# Patient Record
Sex: MALE | Race: WHITE | NOT HISPANIC OR LATINO | Employment: FULL TIME | URBAN - METROPOLITAN AREA
[De-identification: names, ages, dates, MRNs, and addresses within clinical notes are randomized per-mention and may not be internally consistent; named-entity substitution may affect disease eponyms.]

---

## 2023-06-09 DIAGNOSIS — B37.0 THRUSH, ORAL: Primary | ICD-10-CM

## 2023-06-09 RX ORDER — TRIAMCINOLONE ACETONIDE 1 MG/G
1 PASTE DENTAL DAILY
Qty: 30 G | Refills: 1 | Status: SHIPPED | OUTPATIENT
Start: 2023-06-09 | End: 2023-11-09 | Stop reason: SDUPTHER

## 2023-06-09 RX ORDER — TRIAMCINOLONE ACETONIDE 1 MG/G
1 PASTE DENTAL DAILY
COMMUNITY
Start: 2020-03-11 | End: 2023-06-09 | Stop reason: SDUPTHER

## 2023-06-20 DIAGNOSIS — G62.9 NEUROPATHY: ICD-10-CM

## 2023-06-20 DIAGNOSIS — M19.90 CHRONIC ARTHRITIS: ICD-10-CM

## 2023-08-29 RX ORDER — SILDENAFIL 100 MG/1
100 TABLET, FILM COATED ORAL AS NEEDED
COMMUNITY
Start: 2021-05-05 | End: 2023-09-07 | Stop reason: SDUPTHER

## 2023-09-05 RX ORDER — LEVOTHYROXINE SODIUM 75 UG/1
75 TABLET ORAL DAILY
COMMUNITY

## 2023-09-05 RX ORDER — TAMSULOSIN HYDROCHLORIDE 0.4 MG/1
0.4 CAPSULE ORAL NIGHTLY
COMMUNITY
Start: 2023-06-29

## 2023-09-05 RX ORDER — AMMONIUM LACTATE 12 G/100G
CREAM TOPICAL
COMMUNITY
Start: 2023-05-19

## 2023-09-05 RX ORDER — HYDROXYZINE HYDROCHLORIDE 10 MG/1
10 TABLET, FILM COATED ORAL NIGHTLY
COMMUNITY
Start: 2023-07-28

## 2023-09-05 RX ORDER — FLUOCINONIDE 0.5 MG/G
CREAM TOPICAL
COMMUNITY
Start: 2023-03-07

## 2023-09-05 RX ORDER — FINASTERIDE 5 MG/1
5 TABLET, FILM COATED ORAL DAILY
COMMUNITY

## 2023-09-05 RX ORDER — CLOBETASOL PROPIONATE 0.5 MG/G
CREAM TOPICAL
COMMUNITY
Start: 2023-05-19

## 2023-09-06 ENCOUNTER — CLINICAL SUPPORT (OUTPATIENT)
Dept: PRIMARY CARE | Facility: CLINIC | Age: 68
End: 2023-09-06
Payer: MEDICARE

## 2023-09-06 ENCOUNTER — APPOINTMENT (OUTPATIENT)
Dept: PRIMARY CARE | Facility: CLINIC | Age: 68
End: 2023-09-06
Payer: MEDICARE

## 2023-09-06 DIAGNOSIS — R79.9 ABNORMAL BLOOD CHEMISTRY: ICD-10-CM

## 2023-09-06 DIAGNOSIS — E55.9 VITAMIN D DEFICIENCY: ICD-10-CM

## 2023-09-06 DIAGNOSIS — Z00.00 LABORATORY TESTS ORDERED AS PART OF A COMPLETE PHYSICAL EXAM (CPE): ICD-10-CM

## 2023-09-06 PROCEDURE — 90662 IIV NO PRSV INCREASED AG IM: CPT | Performed by: INTERNAL MEDICINE

## 2023-09-06 PROCEDURE — G0008 ADMIN INFLUENZA VIRUS VAC: HCPCS | Performed by: INTERNAL MEDICINE

## 2023-09-06 NOTE — PROGRESS NOTES
Patient is here for high dose flu vaccine in left deltoid 0.7 mL used I/o vaccine used, tolerated well.

## 2023-09-07 DIAGNOSIS — N52.9 ERECTILE DYSFUNCTION, UNSPECIFIED ERECTILE DYSFUNCTION TYPE: Primary | ICD-10-CM

## 2023-09-08 RX ORDER — SILDENAFIL 100 MG/1
100 TABLET, FILM COATED ORAL AS NEEDED
Qty: 6 TABLET | Refills: 5 | Status: SHIPPED | OUTPATIENT
Start: 2023-09-08 | End: 2023-12-15

## 2023-10-17 ENCOUNTER — LAB (OUTPATIENT)
Dept: LAB | Facility: LAB | Age: 68
End: 2023-10-17
Payer: MEDICARE

## 2023-10-17 DIAGNOSIS — Z00.00 LABORATORY TESTS ORDERED AS PART OF A COMPLETE PHYSICAL EXAM (CPE): ICD-10-CM

## 2023-10-17 DIAGNOSIS — R79.9 ABNORMAL BLOOD CHEMISTRY: ICD-10-CM

## 2023-10-17 DIAGNOSIS — E55.9 VITAMIN D DEFICIENCY: ICD-10-CM

## 2023-10-17 LAB
25(OH)D3 SERPL-MCNC: 25 NG/ML (ref 30–100)
ALT SERPL W P-5'-P-CCNC: 25 U/L (ref 10–52)
ANION GAP SERPL CALC-SCNC: 12 MMOL/L (ref 10–20)
APPEARANCE UR: CLEAR
BASOPHILS # BLD AUTO: 0.07 X10*3/UL (ref 0–0.1)
BASOPHILS NFR BLD AUTO: 1.3 %
BILIRUB UR STRIP.AUTO-MCNC: NEGATIVE MG/DL
BUN SERPL-MCNC: 8 MG/DL (ref 6–23)
CALCIUM SERPL-MCNC: 9.2 MG/DL (ref 8.6–10.6)
CHLORIDE SERPL-SCNC: 100 MMOL/L (ref 98–107)
CHOLEST SERPL-MCNC: 167 MG/DL (ref 0–199)
CHOLESTEROL/HDL RATIO: 2.3
CO2 SERPL-SCNC: 25 MMOL/L (ref 21–32)
COLOR UR: NORMAL
CREAT SERPL-MCNC: 0.76 MG/DL (ref 0.5–1.3)
EOSINOPHIL # BLD AUTO: 0.34 X10*3/UL (ref 0–0.7)
EOSINOPHIL NFR BLD AUTO: 6.5 %
ERYTHROCYTE [DISTWIDTH] IN BLOOD BY AUTOMATED COUNT: 13 % (ref 11.5–14.5)
EST. AVERAGE GLUCOSE BLD GHB EST-MCNC: 111 MG/DL
GFR SERPL CREATININE-BSD FRML MDRD: >90 ML/MIN/1.73M*2
GLUCOSE SERPL-MCNC: 92 MG/DL (ref 74–99)
GLUCOSE UR STRIP.AUTO-MCNC: NEGATIVE MG/DL
HBA1C MFR BLD: 5.5 %
HCT VFR BLD AUTO: 43.4 % (ref 41–52)
HDLC SERPL-MCNC: 71.2 MG/DL
HGB BLD-MCNC: 14.2 G/DL (ref 13.5–17.5)
IMM GRANULOCYTES # BLD AUTO: 0.01 X10*3/UL (ref 0–0.7)
IMM GRANULOCYTES NFR BLD AUTO: 0.2 % (ref 0–0.9)
KETONES UR STRIP.AUTO-MCNC: NEGATIVE MG/DL
LDLC SERPL CALC-MCNC: 85 MG/DL
LEUKOCYTE ESTERASE UR QL STRIP.AUTO: NEGATIVE
LYMPHOCYTES # BLD AUTO: 1.46 X10*3/UL (ref 1.2–4.8)
LYMPHOCYTES NFR BLD AUTO: 27.8 %
MCH RBC QN AUTO: 31.5 PG (ref 26–34)
MCHC RBC AUTO-ENTMCNC: 32.7 G/DL (ref 32–36)
MCV RBC AUTO: 96 FL (ref 80–100)
MONOCYTES # BLD AUTO: 0.52 X10*3/UL (ref 0.1–1)
MONOCYTES NFR BLD AUTO: 9.9 %
NEUTROPHILS # BLD AUTO: 2.85 X10*3/UL (ref 1.2–7.7)
NEUTROPHILS NFR BLD AUTO: 54.3 %
NITRITE UR QL STRIP.AUTO: NEGATIVE
NON HDL CHOLESTEROL: 96 MG/DL (ref 0–149)
NRBC BLD-RTO: 0 /100 WBCS (ref 0–0)
PH UR STRIP.AUTO: 8 [PH]
PLATELET # BLD AUTO: 200 X10*3/UL (ref 150–450)
PMV BLD AUTO: 11.3 FL (ref 7.5–11.5)
POTASSIUM SERPL-SCNC: 4.4 MMOL/L (ref 3.5–5.3)
PROT UR STRIP.AUTO-MCNC: NEGATIVE MG/DL
PSA SERPL-MCNC: 0.28 NG/ML
RBC # BLD AUTO: 4.51 X10*6/UL (ref 4.5–5.9)
RBC # UR STRIP.AUTO: NEGATIVE /UL
SODIUM SERPL-SCNC: 133 MMOL/L (ref 136–145)
SP GR UR STRIP.AUTO: 1
TRIGL SERPL-MCNC: 55 MG/DL (ref 0–149)
TSH SERPL-ACNC: 5.6 MIU/L (ref 0.44–3.98)
UROBILINOGEN UR STRIP.AUTO-MCNC: <2 MG/DL
VIT B12 SERPL-MCNC: 498 PG/ML (ref 211–911)
VLDL: 11 MG/DL (ref 0–40)
WBC # BLD AUTO: 5.3 X10*3/UL (ref 4.4–11.3)

## 2023-10-17 PROCEDURE — 84460 ALANINE AMINO (ALT) (SGPT): CPT

## 2023-10-17 PROCEDURE — 80061 LIPID PANEL: CPT

## 2023-10-17 PROCEDURE — 84153 ASSAY OF PSA TOTAL: CPT

## 2023-10-17 PROCEDURE — 81003 URINALYSIS AUTO W/O SCOPE: CPT

## 2023-10-17 PROCEDURE — 80048 BASIC METABOLIC PNL TOTAL CA: CPT

## 2023-10-17 PROCEDURE — 82607 VITAMIN B-12: CPT

## 2023-10-17 PROCEDURE — 84443 ASSAY THYROID STIM HORMONE: CPT

## 2023-10-17 PROCEDURE — 83036 HEMOGLOBIN GLYCOSYLATED A1C: CPT

## 2023-10-17 PROCEDURE — 36415 COLL VENOUS BLD VENIPUNCTURE: CPT

## 2023-10-17 PROCEDURE — 85025 COMPLETE CBC W/AUTO DIFF WBC: CPT

## 2023-10-17 PROCEDURE — 82306 VITAMIN D 25 HYDROXY: CPT

## 2023-11-07 ENCOUNTER — APPOINTMENT (OUTPATIENT)
Dept: PRIMARY CARE | Facility: CLINIC | Age: 68
End: 2023-11-07
Payer: MEDICARE

## 2023-11-08 RX ORDER — FLUTICASONE PROPIONATE 50 MCG
1 SPRAY, SUSPENSION (ML) NASAL EVERY 12 HOURS PRN
COMMUNITY
Start: 2014-11-10

## 2023-11-08 RX ORDER — ACETAMINOPHEN 500 MG
500-1000 TABLET ORAL EVERY 8 HOURS PRN
COMMUNITY
Start: 2021-05-26

## 2023-11-09 ENCOUNTER — OFFICE VISIT (OUTPATIENT)
Dept: PRIMARY CARE | Facility: CLINIC | Age: 68
End: 2023-11-09
Payer: MEDICARE

## 2023-11-09 VITALS
SYSTOLIC BLOOD PRESSURE: 118 MMHG | BODY MASS INDEX: 24 KG/M2 | OXYGEN SATURATION: 99 % | HEART RATE: 72 BPM | HEIGHT: 74 IN | DIASTOLIC BLOOD PRESSURE: 70 MMHG | WEIGHT: 187 LBS | RESPIRATION RATE: 17 BRPM

## 2023-11-09 DIAGNOSIS — R39.9 URINARY SYMPTOM OR SIGN: Primary | ICD-10-CM

## 2023-11-09 DIAGNOSIS — Z00.00 WELL ADULT EXAM: ICD-10-CM

## 2023-11-09 DIAGNOSIS — B37.0 THRUSH, ORAL: ICD-10-CM

## 2023-11-09 PROCEDURE — 1126F AMNT PAIN NOTED NONE PRSNT: CPT | Performed by: INTERNAL MEDICINE

## 2023-11-09 PROCEDURE — 1170F FXNL STATUS ASSESSED: CPT | Performed by: INTERNAL MEDICINE

## 2023-11-09 PROCEDURE — G0439 PPPS, SUBSEQ VISIT: HCPCS | Performed by: INTERNAL MEDICINE

## 2023-11-09 PROCEDURE — 1036F TOBACCO NON-USER: CPT | Performed by: INTERNAL MEDICINE

## 2023-11-09 PROCEDURE — 1159F MED LIST DOCD IN RCRD: CPT | Performed by: INTERNAL MEDICINE

## 2023-11-09 RX ORDER — TRIAMCINOLONE ACETONIDE 1 MG/G
1 PASTE DENTAL DAILY
Qty: 30 G | Refills: 2 | Status: SHIPPED | OUTPATIENT
Start: 2023-11-09

## 2023-11-09 RX ORDER — CIPROFLOXACIN 500 MG/1
500 TABLET ORAL 2 TIMES DAILY
Qty: 20 TABLET | Refills: 0 | Status: SHIPPED | OUTPATIENT
Start: 2023-11-09 | End: 2023-11-19

## 2023-11-09 ASSESSMENT — PATIENT HEALTH QUESTIONNAIRE - PHQ9
SUM OF ALL RESPONSES TO PHQ QUESTIONS 1-9: 0
7. TROUBLE CONCENTRATING ON THINGS, SUCH AS READING THE NEWSPAPER OR WATCHING TELEVISION: NOT AT ALL
4. FEELING TIRED OR HAVING LITTLE ENERGY: NOT AT ALL
10. IF YOU CHECKED OFF ANY PROBLEMS, HOW DIFFICULT HAVE THESE PROBLEMS MADE IT FOR YOU TO DO YOUR WORK, TAKE CARE OF THINGS AT HOME, OR GET ALONG WITH OTHER PEOPLE: NOT DIFFICULT AT ALL
SUM OF ALL RESPONSES TO PHQ9 QUESTIONS 1 AND 2: 0
5. POOR APPETITE OR OVEREATING: NOT AT ALL
2. FEELING DOWN, DEPRESSED OR HOPELESS: NOT AT ALL
3. TROUBLE FALLING OR STAYING ASLEEP OR SLEEPING TOO MUCH: NOT AT ALL
9. THOUGHTS THAT YOU WOULD BE BETTER OFF DEAD, OR OF HURTING YOURSELF: NOT AT ALL
1. LITTLE INTEREST OR PLEASURE IN DOING THINGS: NOT AT ALL
8. MOVING OR SPEAKING SO SLOWLY THAT OTHER PEOPLE COULD HAVE NOTICED. OR THE OPPOSITE, BEING SO FIGETY OR RESTLESS THAT YOU HAVE BEEN MOVING AROUND A LOT MORE THAN USUAL: NOT AT ALL
6. FEELING BAD ABOUT YOURSELF - OR THAT YOU ARE A FAILURE OR HAVE LET YOURSELF OR YOUR FAMILY DOWN: NOT AT ALL

## 2023-11-09 ASSESSMENT — ENCOUNTER SYMPTOMS
LOSS OF SENSATION IN FEET: 0
OCCASIONAL FEELINGS OF UNSTEADINESS: 1
DEPRESSION: 0

## 2023-11-09 ASSESSMENT — PAIN SCALES - GENERAL: PAINLEVEL: 0-NO PAIN

## 2023-11-09 ASSESSMENT — ACTIVITIES OF DAILY LIVING (ADL)
DRESSING: INDEPENDENT
TAKING_MEDICATION: INDEPENDENT
MANAGING_FINANCES: INDEPENDENT
GROCERY_SHOPPING: INDEPENDENT
DOING_HOUSEWORK: INDEPENDENT
BATHING: INDEPENDENT

## 2023-11-09 NOTE — PROGRESS NOTES
"Subjective   Patient ID: Chris Briones is a 68 y.o. male who presents for Medicare Annual Wellness Visit Subsequent and Med Refill.  Patient comes in for a physical examination, doing well over - all with no particular complaints.   Also in for laboratory review and health maintenance update.  Updating family history as well.     Med Refill        Review of Systems   All other systems reviewed and are negative.      Objective   Physical Exam  /70 (BP Location: Left arm)   Pulse 72   Resp 17   Ht 1.867 m (6' 1.5\")   Wt 84.8 kg (187 lb)   SpO2 99%   BMI 24.34 kg/m²         Assessment/Plan   Problem List Items Addressed This Visit    None  Visit Diagnoses       Urinary symptom or sign    -  Primary    Relevant Medications    ciprofloxacin (Cipro) 500 mg tablet    Thrush, oral        Relevant Medications    triamcinolone (Kenalog) 0.1 % oral paste             ASSESSMENT AND PLAN: Patient on examination is in fair health except for medical conditions discussed above, obtained screening blood tests to screen for high cholesterol, diabetes, thyroid, Ekg if above 50 years old or earlier if with cardiac history. Patient should be taking enough calcium in a balanced diet  Weight control especially if BMI is above ideal range. For Female Patients Only:  Mammogram yearly and PAP test with gynecology unless s/p Total hysterectomy  Bone density test at age 60 and above and every two years after that. Preventive Medicine: colon cancer screening by age 45 if no family history as well PSA @ 50 , balanced diet, and exercise as discussed. Seat belt use for injury prevention, living will. Substance use and /or tobacco use counseled when applicable. Alcohol use discussed, use designated . Immunizations TD age 50 and every 10 years. Pneumovax and shingles vaccine counseled. Yearly flu vaccine unless contraindicated. More than 50% of office visit time spent counseling the patient, questions were answered. If problems " arise, patient is to call or come back in. It is understood that the responsibility of healthcare is shared by the patient by following a healthy lifestyle and following the plan above as discussed. Advised complete physical examination every year. Pending additional results, may need to come for a return office visit for follow-up.

## 2023-11-09 NOTE — PROGRESS NOTES
Patient is here for annual medicare wellness and has some concerns to discuss with [facility] , also has some concerns with triamcinolone cream would like Rx for 90 days   No Pain

## 2023-11-29 ENCOUNTER — OFFICE VISIT (OUTPATIENT)
Dept: RHEUMATOLOGY | Facility: CLINIC | Age: 68
End: 2023-11-29
Payer: MEDICARE

## 2023-11-29 VITALS — BODY MASS INDEX: 24.34 KG/M2 | WEIGHT: 187 LBS

## 2023-11-29 DIAGNOSIS — M17.11 PRIMARY OSTEOARTHRITIS OF RIGHT KNEE: Primary | ICD-10-CM

## 2023-11-29 PROCEDURE — 20610 DRAIN/INJ JOINT/BURSA W/O US: CPT | Performed by: INTERNAL MEDICINE

## 2023-11-29 PROCEDURE — 99204 OFFICE O/P NEW MOD 45 MIN: CPT | Performed by: INTERNAL MEDICINE

## 2023-11-29 PROCEDURE — 1126F AMNT PAIN NOTED NONE PRSNT: CPT | Performed by: INTERNAL MEDICINE

## 2023-11-29 PROCEDURE — 1159F MED LIST DOCD IN RCRD: CPT | Performed by: INTERNAL MEDICINE

## 2023-11-29 PROCEDURE — 1036F TOBACCO NON-USER: CPT | Performed by: INTERNAL MEDICINE

## 2023-11-29 RX ORDER — TRIAMCINOLONE ACETONIDE 40 MG/ML
40 INJECTION, SUSPENSION INTRA-ARTICULAR; INTRAMUSCULAR
Status: COMPLETED | OUTPATIENT
Start: 2023-11-29 | End: 2023-11-29

## 2023-11-29 RX ORDER — LIDOCAINE HYDROCHLORIDE 10 MG/ML
1 INJECTION INFILTRATION; PERINEURAL ONCE
Status: COMPLETED | OUTPATIENT
Start: 2023-11-29 | End: 2023-11-29

## 2023-11-29 RX ORDER — TRIAMCINOLONE ACETONIDE 40 MG/ML
40 INJECTION, SUSPENSION INTRA-ARTICULAR; INTRAMUSCULAR ONCE
Status: COMPLETED | OUTPATIENT
Start: 2023-11-29 | End: 2023-11-29

## 2023-11-29 RX ORDER — MELOXICAM 15 MG/1
15 TABLET ORAL DAILY
Qty: 30 TABLET | Refills: 0 | Status: SHIPPED | OUTPATIENT
Start: 2023-11-29 | End: 2023-12-29

## 2023-11-29 RX ORDER — LIDOCAINE HYDROCHLORIDE 10 MG/ML
1 INJECTION INFILTRATION; PERINEURAL
Status: COMPLETED | OUTPATIENT
Start: 2023-11-29 | End: 2023-11-29

## 2023-11-29 RX ADMIN — TRIAMCINOLONE ACETONIDE 40 MG: 40 INJECTION, SUSPENSION INTRA-ARTICULAR; INTRAMUSCULAR at 13:46

## 2023-11-29 RX ADMIN — LIDOCAINE HYDROCHLORIDE 1 ML: 10 INJECTION INFILTRATION; PERINEURAL at 13:45

## 2023-11-29 RX ADMIN — TRIAMCINOLONE ACETONIDE 40 MG: 40 INJECTION, SUSPENSION INTRA-ARTICULAR; INTRAMUSCULAR at 13:40

## 2023-11-29 RX ADMIN — LIDOCAINE HYDROCHLORIDE 1 ML: 10 INJECTION INFILTRATION; PERINEURAL at 13:40

## 2023-11-29 NOTE — PROGRESS NOTES
"Subjective   Patient ID: Chris Briones is a 68 y.o. male who presents for New Patient Visit.    HPI.  68-year-old male with history of hypothyroidism, BPH and OA s/p right THR presented for right knee pain evaluation.    Patient reports progressive, worsening pain in her right knee since right hip surgery in May 2022.  He notes knee pain up to 9/10 upon getting up and walking.  He has not noticed redness, warmth or swelling of the right knee.  No recent trauma to the right knee.    He notes some pain relief when he took leftover meloxicam, hydrocodone.    Daughter lives with him.  He does not smoke.  He drinks 15 beers per week.  He works as .  Denies family history of arthritis.    Review of Systems   All other systems reviewed and are negative.    Objective .      5/5/2021    11:31 AM 10/6/2021    11:50 AM 5/20/2022    11:54 AM 10/25/2022    11:48 AM 11/29/2022    11:53 AM 11/9/2023    11:24 AM 11/29/2023    11:05 AM   Vitals   Systolic 110 112 110  130 118    Diastolic 70 52 60  78 70    Heart Rate  77 77  74 72    Temp  36.5 °C (97.7 °F) 36.5 °C (97.7 °F) 36.7 °C (98 °F) 36.4 °C (97.5 °F)     Resp     16 17    Height (in) 1.854 m (6' 1\") 0.152 m (6\") 1.829 m (6')  1.829 m (6') 1.867 m (6' 1.5\")    Weight (lb) 192 92 189  182 187 187   BMI 25.33 kg/m2 1796.75 kg/m2 25.63 kg/m2  24.68 kg/m2 24.34 kg/m2 24.34 kg/m2   BSA (m2) 2.12 m2 0.42 m2 2.09 m2  2.05 m2 2.1 m2 2.1 m2   Visit Report      Report Report      Physical Exam.  Gen. AAO x3, NAD.  HEENT: No pallor or icterus, PERRLA, EOMI. Oropharynx is clear. MM moist,Parotid glands  not enlarged. No cervical lymphadenopathy .  Skin: No rashes.  Heart: S1, S2/ RRR.   Lungs: CTA B.  Abdomen: Soft, NT/ND, BS regular.  MSK: No.swelling or tenderness of the  upper  joints with full ROM, Neck,spine and Franco SI with out tenderness.  Right knee with patellofemoral crepitation and lateral joint line tenderness.  No knee effusion, erythema or warmth.  Knee range " of motion full.  Bilateral ankle and MTPs without swelling and tenderness.  Neuro: CN II-XII intact. Sensation to touch intact.Strength 5/5 throughout. DTR 2+ and symmetrical.  Psych:Appropriate mood and behavior  EXT: No edema    Assessment/Plan .    68-year-old male with history of hypothyroidism, BPH and OA s/p right THR presented with worsening right knee pain.    After history, physical examination and review of available labs, it appears he has osteoarthritis of the right knee.  -Patient was counseled regarding the diagnosis, management and outcomes in detail.  -Patient was asked to take Tylenol or meloxicam as needed.  -Kenalog 40 mg mixed with 1 mL of 1% lidocaine injected to the right knee joint.  Patient ID: Chris Briones is a 68 y.o. male.    Joint Injection Large/Arthrocentesis: R knee on 11/29/2023 1:40 PM  Indications: pain  Details: 25 G needle, anterolateral approach  Medications: 40 mg triamcinolone acetonide 40 mg/mL; 1 mL lidocaine 10 mg/mL (1 %)  Outcome: tolerated well, no immediate complications  Procedure, treatment alternatives, risks and benefits explained, specific risks discussed. Consent was given by the patient. Immediately prior to procedure a time out was called to verify the correct patient, procedure, equipment, support staff and site/side marked as required. Patient was prepped and draped in the usual sterile fashion.         Follow-up in 3 months.     This note was partially generated using the Dragon Voice recognition system. There may be some incorrect wording, spelling and/or spelling errors or punctuation errors that were not corrected prior to committing the note to the medical record.    Patient Active Problem List   Diagnosis    Thrush, oral    Urinary symptom or sign    Well adult exam      Past Surgical History:   Procedure Laterality Date    APPENDECTOMY  11/10/2014    Appendectomy    BACK SURGERY  11/10/2014    Back Surgery    ROTATOR CUFF REPAIR  08/22/2016    Rotator  Cuff Repair    TONSILLECTOMY  11/10/2014    Tonsillectomy      Social History     Tobacco Use    Smoking status: Never    Smokeless tobacco: Never   Substance Use Topics    Alcohol use: Yes     Comment: occansionally      No family history on file.   Allergies   Allergen Reactions    Dog Dander Other    House Dust Other      Current Outpatient Medications   Medication Instructions    acetaminophen (TYLENOL) 500-1,000 mg, oral, Every 8 hours PRN    ammonium lactate (Amlactin) 12 % cream apply to body once daily after a shower or bath    clobetasol (Temovate) 0.05 % cream APPLY TO THE AFFECTED AREA(S) on the body TWICE DAILY    finasteride (PROSCAR) 5 mg, oral, Daily    fluocinonide 0.05 % cream APPLY TO THE AFFECTED AREA(S) of rash on the body once daily    fluticasone (Flonase) 50 mcg/actuation nasal spray 1 spray, nasal, Every 12 hours PRN    hydrOXYzine HCL (ATARAX) 10 mg, oral, Nightly    levothyroxine (SYNTHROID, LEVOXYL) 75 mcg, oral, Daily    meloxicam (MOBIC) 15 mg, oral, Daily    sildenafil (VIAGRA) 100 mg, oral, As needed    tamsulosin (FLOMAX) 0.4 mg, oral, Nightly    triamcinolone (Kenalog) 0.1 % oral paste 1 Application, Mouth/Throat, Daily

## 2023-11-30 ENCOUNTER — TELEPHONE (OUTPATIENT)
Dept: RHEUMATOLOGY | Facility: CLINIC | Age: 68
End: 2023-11-30
Payer: MEDICARE

## 2023-11-30 NOTE — TELEPHONE ENCOUNTER
Patient called -he tried meloxicam today and it offered no relief .  He wanted to  know if he could get a right knee xray and a referral to an orthopedic surgeon?      [phone]

## 2023-12-07 DIAGNOSIS — K59.00 CONSTIPATION, UNSPECIFIED: ICD-10-CM

## 2023-12-14 DIAGNOSIS — N52.9 ERECTILE DYSFUNCTION, UNSPECIFIED ERECTILE DYSFUNCTION TYPE: ICD-10-CM

## 2023-12-15 RX ORDER — SILDENAFIL 100 MG/1
100 TABLET, FILM COATED ORAL AS NEEDED
Qty: 6 TABLET | Refills: 1 | Status: SHIPPED | OUTPATIENT
Start: 2023-12-15 | End: 2024-06-10 | Stop reason: SDUPTHER

## 2023-12-29 RX ORDER — DOCUSATE SODIUM 100 MG/1
100 CAPSULE, LIQUID FILLED ORAL 2 TIMES DAILY PRN
Qty: 60 CAPSULE | Refills: 1 | Status: SHIPPED | OUTPATIENT
Start: 2023-12-29

## 2024-01-04 ENCOUNTER — HOSPITAL ENCOUNTER (OUTPATIENT)
Dept: RADIOLOGY | Facility: HOSPITAL | Age: 69
Discharge: HOME | End: 2024-01-04
Payer: MEDICARE

## 2024-01-04 ENCOUNTER — OFFICE VISIT (OUTPATIENT)
Dept: ORTHOPEDIC SURGERY | Facility: HOSPITAL | Age: 69
End: 2024-01-04
Payer: MEDICARE

## 2024-01-04 VITALS — HEIGHT: 73 IN | WEIGHT: 192 LBS | BODY MASS INDEX: 25.45 KG/M2

## 2024-01-04 DIAGNOSIS — M25.561 CHRONIC PAIN OF RIGHT KNEE: ICD-10-CM

## 2024-01-04 DIAGNOSIS — M17.11 ARTHRITIS OF RIGHT KNEE: Primary | ICD-10-CM

## 2024-01-04 DIAGNOSIS — G89.29 CHRONIC PAIN OF RIGHT KNEE: ICD-10-CM

## 2024-01-04 PROCEDURE — 73562 X-RAY EXAM OF KNEE 3: CPT | Mod: RIGHT SIDE | Performed by: RADIOLOGY

## 2024-01-04 PROCEDURE — 1160F RVW MEDS BY RX/DR IN RCRD: CPT | Performed by: ORTHOPAEDIC SURGERY

## 2024-01-04 PROCEDURE — 1126F AMNT PAIN NOTED NONE PRSNT: CPT | Performed by: ORTHOPAEDIC SURGERY

## 2024-01-04 PROCEDURE — 99204 OFFICE O/P NEW MOD 45 MIN: CPT | Performed by: ORTHOPAEDIC SURGERY

## 2024-01-04 PROCEDURE — 1159F MED LIST DOCD IN RCRD: CPT | Performed by: ORTHOPAEDIC SURGERY

## 2024-01-04 PROCEDURE — 99214 OFFICE O/P EST MOD 30 MIN: CPT | Performed by: ORTHOPAEDIC SURGERY

## 2024-01-04 PROCEDURE — 73562 X-RAY EXAM OF KNEE 3: CPT | Mod: RT

## 2024-01-04 PROCEDURE — 1036F TOBACCO NON-USER: CPT | Performed by: ORTHOPAEDIC SURGERY

## 2024-01-04 ASSESSMENT — PAIN SCALES - GENERAL: PAINLEVEL_OUTOF10: 7

## 2024-01-04 ASSESSMENT — PAIN - FUNCTIONAL ASSESSMENT: PAIN_FUNCTIONAL_ASSESSMENT: 0-10

## 2024-01-04 NOTE — PROGRESS NOTES
68-year-old is seen with right knee pain.  He has been having persistent severe sharp shooting pain in the right knee.  Has difficulty with standing and walking.  Difficulty going up and down stairs and getting up and down from a chair in and out of a car.  Has had many years of knee pain.  Approximately 20 years ago he had a knee arthroscopy following the patella fracture and was noted to have chondral changes at that time.  The operative note is personally reviewed.  He has been undergoing treatment at [facility] and is in the middle of a course of viscosupplementation and has 1 remaining injection tomorrow.  Cortisone has not helped him.  He takes Tylenol 1000 mg 3-4 times a day with mild relief.  He has done physical therapy.  He has had his right hip replaced.  He also takes meloxicam with mild relief.  He is is on medication for eczema.    Pleasant and no acute distress. Walks with antalgic gait. Stands with varus alignment of the right knee and neutral on the left.  Right knee range of motion is 5-110°. The knee is stable to varus and valgus stress Lachman and posterior drawer. There is a mild effusion. There is generalized tenderness. Left knee range of motion is 0-120°. There is no effusion. The knee is stable to varus and valgus stress Lachman and posterior drawer. Both lower extremities are well perfused the skin is intact and muscle tone is adequate.    Multiple x-ray views of the right knee are personally reviewed there is advanced degenerative disease involving the right knee primarily in the medial and patellofemoral compartment.  No acute bony abnormality.    A detailed discussion about knee arthritis was done.  Treatment options including no treatment reviewed.  He is going to complete the course of viscosupplementation.  Knee replacement was discussed in detail as a definitive treatment option.  His most recent cortisone injection was November 29.  He can schedule knee replacement 90 days  after his last viscosupplementation injection tomorrow.  He will avoid aggravating activities in the meantime.  If he is having continued pain and decides to schedule knee replacement he would also need an additional visit for further surgical discussion.

## 2024-01-08 ENCOUNTER — HOSPITAL ENCOUNTER (OUTPATIENT)
Dept: RADIOLOGY | Facility: EXTERNAL LOCATION | Age: 69
Discharge: HOME | End: 2024-01-08

## 2024-01-08 ENCOUNTER — PREP FOR PROCEDURE (OUTPATIENT)
Dept: ORTHOPEDIC SURGERY | Facility: CLINIC | Age: 69
End: 2024-01-08
Payer: MEDICARE

## 2024-01-08 ENCOUNTER — HOSPITAL ENCOUNTER (OUTPATIENT)
Facility: HOSPITAL | Age: 69
Setting detail: OUTPATIENT SURGERY
End: 2024-01-08
Attending: ORTHOPAEDIC SURGERY | Admitting: ORTHOPAEDIC SURGERY
Payer: MEDICARE

## 2024-01-08 DIAGNOSIS — S80.01XA CONTUSION OF RIGHT KNEE, INITIAL ENCOUNTER: ICD-10-CM

## 2024-01-08 DIAGNOSIS — M17.11 ARTHRITIS OF RIGHT KNEE: Primary | ICD-10-CM

## 2024-01-08 RX ORDER — SODIUM CHLORIDE, SODIUM LACTATE, POTASSIUM CHLORIDE, CALCIUM CHLORIDE 600; 310; 30; 20 MG/100ML; MG/100ML; MG/100ML; MG/100ML
100 INJECTION, SOLUTION INTRAVENOUS CONTINUOUS
Status: CANCELLED | OUTPATIENT
Start: 2024-01-08

## 2024-01-09 ENCOUNTER — APPOINTMENT (OUTPATIENT)
Dept: ORTHOPEDIC SURGERY | Facility: CLINIC | Age: 69
End: 2024-01-09
Payer: MEDICARE

## 2024-01-12 ENCOUNTER — TELEPHONE (OUTPATIENT)
Dept: PRIMARY CARE | Facility: CLINIC | Age: 69
End: 2024-01-12
Payer: MEDICARE

## 2024-01-12 DIAGNOSIS — R05.1 ACUTE COUGH: Primary | ICD-10-CM

## 2024-01-12 RX ORDER — AMOXICILLIN AND CLAVULANATE POTASSIUM 875; 125 MG/1; MG/1
875 TABLET, FILM COATED ORAL 2 TIMES DAILY
Qty: 10 TABLET | Refills: 0 | Status: SHIPPED | OUTPATIENT
Start: 2024-01-12 | End: 2024-01-22

## 2024-01-12 NOTE — TELEPHONE ENCOUNTER
Sched pt for ED FU 1/18.   Pt still has cough from ED 1/4 but is out of Rx.  Pt req 5-10 Pills of Augmentin to [facility] [place]

## 2024-01-18 ENCOUNTER — ANCILLARY PROCEDURE (OUTPATIENT)
Dept: RADIOLOGY | Facility: CLINIC | Age: 69
End: 2024-01-18
Payer: MEDICARE

## 2024-01-18 ENCOUNTER — APPOINTMENT (OUTPATIENT)
Dept: PRIMARY CARE | Facility: CLINIC | Age: 69
End: 2024-01-18
Payer: MEDICARE

## 2024-01-18 DIAGNOSIS — M17.11 ARTHRITIS OF RIGHT KNEE: ICD-10-CM

## 2024-01-18 PROCEDURE — 73562 X-RAY EXAM OF KNEE 3: CPT | Mod: RT

## 2024-01-18 PROCEDURE — 73562 X-RAY EXAM OF KNEE 3: CPT | Mod: RIGHT SIDE | Performed by: RADIOLOGY

## 2024-01-18 PROCEDURE — 73560 X-RAY EXAM OF KNEE 1 OR 2: CPT | Mod: RIGHT SIDE | Performed by: RADIOLOGY

## 2024-01-24 ENCOUNTER — APPOINTMENT (OUTPATIENT)
Dept: PRIMARY CARE | Facility: CLINIC | Age: 69
End: 2024-01-24
Payer: MEDICARE

## 2024-02-01 ENCOUNTER — OFFICE VISIT (OUTPATIENT)
Dept: ORTHOPEDIC SURGERY | Facility: HOSPITAL | Age: 69
End: 2024-02-01
Payer: MEDICARE

## 2024-02-01 VITALS — BODY MASS INDEX: 24.38 KG/M2 | WEIGHT: 190 LBS | HEIGHT: 74 IN

## 2024-02-01 DIAGNOSIS — M17.11 ARTHRITIS OF RIGHT KNEE: Primary | ICD-10-CM

## 2024-02-01 PROCEDURE — 1126F AMNT PAIN NOTED NONE PRSNT: CPT | Performed by: ORTHOPAEDIC SURGERY

## 2024-02-01 PROCEDURE — 99214 OFFICE O/P EST MOD 30 MIN: CPT | Mod: 27 | Performed by: ORTHOPAEDIC SURGERY

## 2024-02-01 PROCEDURE — 1157F ADVNC CARE PLAN IN RCRD: CPT | Performed by: ORTHOPAEDIC SURGERY

## 2024-02-01 PROCEDURE — 1159F MED LIST DOCD IN RCRD: CPT | Performed by: ORTHOPAEDIC SURGERY

## 2024-02-01 PROCEDURE — 1036F TOBACCO NON-USER: CPT | Performed by: ORTHOPAEDIC SURGERY

## 2024-02-01 PROCEDURE — 99214 OFFICE O/P EST MOD 30 MIN: CPT | Performed by: ORTHOPAEDIC SURGERY

## 2024-02-01 PROCEDURE — 1160F RVW MEDS BY RX/DR IN RCRD: CPT | Performed by: ORTHOPAEDIC SURGERY

## 2024-02-01 RX ORDER — MELOXICAM 15 MG/1
TABLET ORAL
COMMUNITY
Start: 2024-01-05 | End: 2024-02-08 | Stop reason: SDUPTHER

## 2024-02-01 ASSESSMENT — PAIN - FUNCTIONAL ASSESSMENT: PAIN_FUNCTIONAL_ASSESSMENT: 0-10

## 2024-02-01 ASSESSMENT — PAIN SCALES - GENERAL: PAINLEVEL_OUTOF10: 2

## 2024-02-01 ASSESSMENT — PAIN DESCRIPTION - DESCRIPTORS: DESCRIPTORS: SHARP

## 2024-02-01 NOTE — PROGRESS NOTES
68-year-old is with right knee pain.  Is been having persistent severe sharp shooting pain in the right knee.  Difficulty with standing and walking going up and down stairs and getting up and down from a chair and in and out of a car.  He said persistent symptoms for several years despite conservative treatment that has included viscosupplementation and cortisone and Tylenol.  He has used meloxicam also.  He is on medication for eczema.  20 years ago he had a knee arthroscopy following a patella fracture and was noted to have chondral changes at that time.    Pleasant and no acute distress. Walks with antalgic gait. Stands with varus alignment of the right knee and neutral on the left.  Right knee range of motion is 5-110°. The knee is stable to varus and valgus stress Lachman and posterior drawer. There is a mild effusion. There is generalized tenderness. Left knee range of motion is 0-120°. There is no effusion. The knee is stable to varus and valgus stress Lachman and posterior drawer. Both lower extremities are well perfused the skin is intact and muscle tone is adequate.    Multiple x-ray views of the right knee are personally reviewed and these demonstrate advanced degenerative changes with complete loss of joint space and osteophyte formation and subchondral sclerosis and cystic change.    The patient has undergone extensive conservative treatment but has persistent severe debilitating pain and advanced degenerative changes on x-ray.  Treatment options including no treatment reviewed and the decision was made to proceed with right total knee arthroplasty.  The surgery and postoperative course were reviewed in detail.  Risks including but not limited to infection thromboembolus neurovascular injury fracture bleeding medical problems stiffness and implant failure or loosening were discussed and they understand this and have elected to proceed.  They will have medical clearance.  Avoidance of aggravating  activities will be done in the meantime.  Intravenous TXA will be used at the time of the procedure.  His most recent cortisone injection was January 4, 2024 and he needs to wait at least 3 months from this injection.

## 2024-02-07 ENCOUNTER — APPOINTMENT (OUTPATIENT)
Dept: ORTHOPEDIC SURGERY | Facility: CLINIC | Age: 69
End: 2024-02-07
Payer: MEDICARE

## 2024-02-08 ENCOUNTER — TELEPHONE (OUTPATIENT)
Dept: PRIMARY CARE | Facility: CLINIC | Age: 69
End: 2024-02-08
Payer: MEDICARE

## 2024-02-08 DIAGNOSIS — M54.50 LOW BACK PAIN WITHOUT SCIATICA, UNSPECIFIED BACK PAIN LATERALITY, UNSPECIFIED CHRONICITY: Primary | ICD-10-CM

## 2024-02-08 RX ORDER — MELOXICAM 15 MG/1
15 TABLET ORAL DAILY
Qty: 30 TABLET | Refills: 1 | Status: SHIPPED | OUTPATIENT
Start: 2024-02-08 | End: 2024-04-08

## 2024-02-08 NOTE — TELEPHONE ENCOUNTER
Meloxicam 15mg  1/day    Qty 2months.    Prev prescribed by ortho surgeon, would like [facility] to take over prescription for this med.    PHARM:  [facility]

## 2024-02-13 ENCOUNTER — ANCILLARY PROCEDURE (OUTPATIENT)
Dept: CARDIOLOGY | Facility: CLINIC | Age: 69
End: 2024-02-13
Payer: MEDICARE

## 2024-02-13 ENCOUNTER — OFFICE VISIT (OUTPATIENT)
Dept: CARDIOLOGY | Facility: CLINIC | Age: 69
End: 2024-02-13
Payer: MEDICARE

## 2024-02-13 VITALS
WEIGHT: 190 LBS | OXYGEN SATURATION: 99 % | BODY MASS INDEX: 24.38 KG/M2 | RESPIRATION RATE: 18 BRPM | DIASTOLIC BLOOD PRESSURE: 68 MMHG | HEIGHT: 74 IN | SYSTOLIC BLOOD PRESSURE: 116 MMHG | HEART RATE: 88 BPM

## 2024-02-13 DIAGNOSIS — R07.9 CHEST PAIN, UNSPECIFIED TYPE: Primary | ICD-10-CM

## 2024-02-13 DIAGNOSIS — Z01.810 PREOPERATIVE CARDIOVASCULAR EXAMINATION: ICD-10-CM

## 2024-02-13 PROCEDURE — 1126F AMNT PAIN NOTED NONE PRSNT: CPT

## 2024-02-13 PROCEDURE — 93005 ELECTROCARDIOGRAM TRACING: CPT

## 2024-02-13 PROCEDURE — 1157F ADVNC CARE PLAN IN RCRD: CPT

## 2024-02-13 PROCEDURE — 1036F TOBACCO NON-USER: CPT

## 2024-02-13 PROCEDURE — 1159F MED LIST DOCD IN RCRD: CPT

## 2024-02-13 PROCEDURE — 93010 ELECTROCARDIOGRAM REPORT: CPT | Performed by: STUDENT IN AN ORGANIZED HEALTH CARE EDUCATION/TRAINING PROGRAM

## 2024-02-13 PROCEDURE — 99204 OFFICE O/P NEW MOD 45 MIN: CPT

## 2024-02-13 PROCEDURE — 1160F RVW MEDS BY RX/DR IN RCRD: CPT

## 2024-02-13 RX ORDER — ASPIRIN 325 MG
650 TABLET ORAL DAILY
COMMUNITY

## 2024-02-13 RX ORDER — REGADENOSON 0.08 MG/ML
0.4 INJECTION, SOLUTION INTRAVENOUS
OUTPATIENT
Start: 2024-02-13

## 2024-02-13 ASSESSMENT — ENCOUNTER SYMPTOMS
DEPRESSION: 0
OCCASIONAL FEELINGS OF UNSTEADINESS: 1
LOSS OF SENSATION IN FEET: 0

## 2024-02-13 ASSESSMENT — PATIENT HEALTH QUESTIONNAIRE - PHQ9
SUM OF ALL RESPONSES TO PHQ9 QUESTIONS 1 & 2: 0
2. FEELING DOWN, DEPRESSED OR HOPELESS: NOT AT ALL
1. LITTLE INTEREST OR PLEASURE IN DOING THINGS: NOT AT ALL

## 2024-02-13 ASSESSMENT — PAIN SCALES - GENERAL: PAINLEVEL: 0-NO PAIN

## 2024-02-13 NOTE — PATIENT INSTRUCTIONS
Mr. Briones,    The patient is unable to complete treadmill excercise stress testing due to comorbidities including significant osteoarthritis.  Given the patient's risk factors and symptoms, we will obtain a nuclear pharmacologic stress test for further ischemic evaluation. Please avoid caffeine the day prior to and the day of the stress test. Do not eat the morning of the stress test or 12 hours prior.    Virtual visit after test

## 2024-02-13 NOTE — PROGRESS NOTES
Chief Complaint: Pre-op Cardiac risk stratification.  History Of Present Illness:    Chris Briones is a 68 y.o. male retired  presenting with PMH as seen below. He is here for evaluation of chest pain and Cardiac risk stratification for knee replacement.     Chest pain : started several weeks ago. Left sided can occur at anytime 30 min .  No exacerbating or relieving factors.  Pt denies shortness of breath, dyspnea on exertion, orthopnea, and paroxysmal nocturnal dyspnea.  Pt denies worsening lower extremity edema.  Pt denies palpitations or syncope.  No recent falls.  No fever or chills.  No cough.  No change in bowel or bladder habits.  No sick contacts.  No recent travel.    ROS: 12 point review of systems including (Constintutional, Eyes, ENMT, Respiratory, Cardiac, Gastrointestinal, Nuerological, Psychiatric, and Hematologic) was performed and is otherwise negative unless noted in HPI.    Last Recorded Vitals:  Vitals:    02/13/24 1317   BP: 116/68   Pulse: 88   Resp: 18   SpO2: 99%     Past Medical History:  He has a past medical history of Unspecified osteoarthritis, unspecified site (03/10/2022).    Past Surgical History:  He has a past surgical history that includes Appendectomy (11/10/2014); Back surgery (11/10/2014); Tonsillectomy (11/10/2014); and Rotator cuff repair (08/22/2016).      Social History:  He reports that he has never smoked. He has never used smokeless tobacco. He reports current alcohol use. He reports that he does not use drugs.    Family History:  Dad 89 MI, no premature cardiomyopathy.     Allergies:  Dog dander and House dust    Outpatient Medications:  Current Outpatient Medications   Medication Instructions    acetaminophen (TYLENOL) 500-1,000 mg, oral, Every 8 hours PRN    ammonium lactate (Amlactin) 12 % cream apply to body once daily after a shower or bath    clobetasol (Temovate) 0.05 % cream APPLY TO THE AFFECTED AREA(S) on the body TWICE DAILY    docusate sodium  (COLACE) 100 mg, oral, 2 times daily PRN    finasteride (PROSCAR) 5 mg, oral, Daily    fluocinonide 0.05 % cream APPLY TO THE AFFECTED AREA(S) of rash on the body once daily    fluticasone (Flonase) 50 mcg/actuation nasal spray 1 spray, nasal, Every 12 hours PRN    hydrOXYzine HCL (ATARAX) 10 mg, oral, Nightly    levothyroxine (SYNTHROID, LEVOXYL) 75 mcg, oral, Daily    meloxicam (MOBIC) 15 mg, oral, Daily    sildenafil (VIAGRA) 100 mg, oral, As needed    tamsulosin (FLOMAX) 0.4 mg, oral, Nightly    triamcinolone (Kenalog) 0.1 % oral paste 1 Application, Mouth/Throat, Daily       Physical Exam  Constitutional:       Appearance: Normal appearance.   Neck:      Vascular: No carotid bruit.   Cardiovascular:      Rate and Rhythm: Normal rate and regular rhythm.      Pulses: Normal pulses.      Heart sounds: Normal heart sounds.      No gallop.   Pulmonary:      Effort: Pulmonary effort is normal.      Breath sounds: Normal breath sounds.   Abdominal:      Palpations: Abdomen is soft.   Musculoskeletal:      Cervical back: Neck supple.   Skin:     General: Skin is warm.      Capillary Refill: Capillary refill takes less than 2 seconds.   Neurological:      General: No focal deficit present.      Mental Status: He is alert and oriented to person, place, and time.   Psychiatric:         Mood and Affect: Mood normal.     Last Labs:  CBC -  Lab Results   Component Value Date    WBC 5.3 10/17/2023    HGB 14.2 10/17/2023    HCT 43.4 10/17/2023    MCV 96 10/17/2023     10/17/2023       CMP -  Lab Results   Component Value Date    CALCIUM 9.2 10/17/2023    PROT 5.3 (L) 04/29/2021    ALBUMIN 3.0 (L) 04/29/2021    AST 16 04/29/2021    ALT 25 10/17/2023    ALKPHOS 46 04/29/2021    BILITOT 0.5 04/29/2021       LIPID PANEL -   Lab Results   Component Value Date    CHOL 167 10/17/2023    TRIG 55 10/17/2023    HDL 71.2 10/17/2023    CHHDL 2.3 10/17/2023    LDLF 72 04/26/2022    VLDL 11 10/17/2023    NHDL 96 10/17/2023        RENAL FUNCTION PANEL -   Lab Results   Component Value Date    GLUCOSE 92 10/17/2023     (L) 10/17/2023    K 4.4 10/17/2023     10/17/2023    CO2 25 10/17/2023    ANIONGAP 12 10/17/2023    BUN 8 10/17/2023    CREATININE 0.76 10/17/2023    GFRMALE >90 04/26/2022    CALCIUM 9.2 10/17/2023    ALBUMIN 3.0 (L) 04/29/2021        Lab Results   Component Value Date    HGBA1C 5.5 10/17/2023       Last Cardiology Tests:  ECG:  NSR 83 bpm    Assessment/Plan   Chris Briones is a 68 y.o. male retired  presenting with PMH as seen below. He is here for evaluation of chest pain and Cardiac risk stratification for knee replacement.     Chest pain : started several weeks ago. Left sided can occur at anytime 30 min .  No exacerbating or relieving factors. Given symptoms will have him obtain an ischemic evaluation to further determine his cardiac risk.     - The patient is unable to complete treadmill excercise stress testing due to comorbidities including significant osteoarthritis.  Given the patient's risk factors and symptoms, we will obtain a nuclear pharmacologic stress test for further ischemic evaluation. Please avoid caffeine the day prior to and the day of the stress test. Do not eat the morning of the stress test or 12 hours prior    -Virtual Visit after stress test.      Morelia Theodore, APRN-CNP

## 2024-02-16 LAB
ATRIAL RATE: 83 BPM
P AXIS: 79 DEGREES
P OFFSET: 200 MS
P ONSET: 146 MS
PR INTERVAL: 148 MS
Q ONSET: 220 MS
QRS COUNT: 13 BEATS
QRS DURATION: 86 MS
QT INTERVAL: 342 MS
QTC CALCULATION(BAZETT): 401 MS
QTC FREDERICIA: 381 MS
R AXIS: 81 DEGREES
T AXIS: 73 DEGREES
T OFFSET: 391 MS
VENTRICULAR RATE: 83 BPM

## 2024-02-20 ENCOUNTER — APPOINTMENT (OUTPATIENT)
Dept: CARDIOLOGY | Facility: CLINIC | Age: 69
End: 2024-02-20
Payer: MEDICARE

## 2024-02-28 ENCOUNTER — APPOINTMENT (OUTPATIENT)
Dept: RHEUMATOLOGY | Facility: CLINIC | Age: 69
End: 2024-02-28
Payer: MEDICARE

## 2024-02-29 ENCOUNTER — HOSPITAL ENCOUNTER (OUTPATIENT)
Dept: CARDIOLOGY | Facility: HOSPITAL | Age: 69
Discharge: HOME | End: 2024-02-29
Payer: MEDICARE

## 2024-02-29 ENCOUNTER — HOSPITAL ENCOUNTER (OUTPATIENT)
Dept: RADIOLOGY | Facility: HOSPITAL | Age: 69
Discharge: HOME | End: 2024-02-29
Payer: MEDICARE

## 2024-02-29 DIAGNOSIS — R07.9 CHEST PAIN, UNSPECIFIED TYPE: ICD-10-CM

## 2024-02-29 PROCEDURE — 3430000001 HC RX 343 DIAGNOSTIC RADIOPHARMACEUTICALS

## 2024-02-29 PROCEDURE — A9502 TC99M TETROFOSMIN: HCPCS

## 2024-02-29 PROCEDURE — 93017 CV STRESS TEST TRACING ONLY: CPT

## 2024-02-29 PROCEDURE — 78452 HT MUSCLE IMAGE SPECT MULT: CPT | Performed by: STUDENT IN AN ORGANIZED HEALTH CARE EDUCATION/TRAINING PROGRAM

## 2024-02-29 PROCEDURE — 78452 HT MUSCLE IMAGE SPECT MULT: CPT

## 2024-02-29 RX ADMIN — TETROFOSMIN 33.4 MILLICURIE: 0.23 INJECTION, POWDER, LYOPHILIZED, FOR SOLUTION INTRAVENOUS at 10:54

## 2024-02-29 RX ADMIN — TETROFOSMIN 10.2 MILLICURIE: 0.23 INJECTION, POWDER, LYOPHILIZED, FOR SOLUTION INTRAVENOUS at 09:35

## 2024-03-01 PROBLEM — Z01.818 PREOP TESTING: Status: ACTIVE | Noted: 2024-03-01

## 2024-05-09 DIAGNOSIS — M54.9 BACK PAIN, UNSPECIFIED BACK LOCATION, UNSPECIFIED BACK PAIN LATERALITY, UNSPECIFIED CHRONICITY: Primary | ICD-10-CM

## 2024-05-09 NOTE — TELEPHONE ENCOUNTER
Patient called in asking for a prescription to be written for a Rolator and a shower chair , and mail it to the home address on file . Pended both ,   
Yes

## 2024-05-13 DIAGNOSIS — R26.81 UNSTEADY GAIT: ICD-10-CM

## 2024-05-15 RX ORDER — NYSTATIN AND TRIAMCINOLONE ACETONIDE 100000; 1 [USP'U]/G; MG/G
OINTMENT TOPICAL
COMMUNITY
Start: 2024-01-16

## 2024-05-15 RX ORDER — TOBRAMYCIN AND DEXAMETHASONE 3; 1 MG/ML; MG/ML
SUSPENSION/ DROPS OPHTHALMIC EVERY 6 HOURS
COMMUNITY
Start: 2018-05-31

## 2024-05-15 RX ORDER — NAPROXEN 500 MG/1
TABLET ORAL
COMMUNITY
Start: 2014-11-07

## 2024-05-15 RX ORDER — MUPIROCIN 20 MG/G
OINTMENT TOPICAL
COMMUNITY
Start: 2024-01-10

## 2024-05-15 RX ORDER — SALINE NASAL SPRAY 1.5 OZ
SOLUTION NASAL
COMMUNITY

## 2024-05-15 RX ORDER — METHOCARBAMOL 500 MG/1
500 TABLET, FILM COATED ORAL 3 TIMES DAILY PRN
COMMUNITY

## 2024-05-15 RX ORDER — PHENOL 1.4 %
AEROSOL, SPRAY (ML) MUCOUS MEMBRANE
COMMUNITY
Start: 2017-04-12

## 2024-05-16 ENCOUNTER — APPOINTMENT (OUTPATIENT)
Dept: ORTHOPEDIC SURGERY | Facility: HOSPITAL | Age: 69
End: 2024-05-16
Payer: MEDICARE

## 2024-05-16 ENCOUNTER — OFFICE VISIT (OUTPATIENT)
Dept: PRIMARY CARE | Facility: CLINIC | Age: 69
End: 2024-05-16
Payer: MEDICARE

## 2024-05-16 VITALS
BODY MASS INDEX: 25.28 KG/M2 | DIASTOLIC BLOOD PRESSURE: 74 MMHG | SYSTOLIC BLOOD PRESSURE: 102 MMHG | OXYGEN SATURATION: 99 % | RESPIRATION RATE: 16 BRPM | WEIGHT: 197 LBS | HEART RATE: 80 BPM | HEIGHT: 74 IN

## 2024-05-16 DIAGNOSIS — M25.561 RIGHT KNEE PAIN, UNSPECIFIED CHRONICITY: Primary | ICD-10-CM

## 2024-05-16 PROCEDURE — 1036F TOBACCO NON-USER: CPT | Performed by: INTERNAL MEDICINE

## 2024-05-16 PROCEDURE — 1157F ADVNC CARE PLAN IN RCRD: CPT | Performed by: INTERNAL MEDICINE

## 2024-05-16 PROCEDURE — 99213 OFFICE O/P EST LOW 20 MIN: CPT | Performed by: INTERNAL MEDICINE

## 2024-05-16 PROCEDURE — 1124F ACP DISCUSS-NO DSCNMKR DOCD: CPT | Performed by: INTERNAL MEDICINE

## 2024-05-16 PROCEDURE — 1159F MED LIST DOCD IN RCRD: CPT | Performed by: INTERNAL MEDICINE

## 2024-05-16 PROCEDURE — 1160F RVW MEDS BY RX/DR IN RCRD: CPT | Performed by: INTERNAL MEDICINE

## 2024-05-16 RX ORDER — TRAMADOL HYDROCHLORIDE 50 MG/1
50 TABLET ORAL EVERY 8 HOURS PRN
Qty: 10 TABLET | Refills: 0 | Status: SHIPPED | OUTPATIENT
Start: 2024-05-16 | End: 2024-05-21

## 2024-05-24 DIAGNOSIS — M17.11 ARTHRITIS OF RIGHT KNEE: ICD-10-CM

## 2024-05-24 NOTE — TELEPHONE ENCOUNTER
Patient called in for a shower chair prescription to be faxed over, also needs a demographic , and recent notes from appointment     Fax number - [phone]

## 2024-06-10 DIAGNOSIS — N52.9 ERECTILE DYSFUNCTION, UNSPECIFIED ERECTILE DYSFUNCTION TYPE: ICD-10-CM

## 2024-06-10 RX ORDER — SILDENAFIL 100 MG/1
100 TABLET, FILM COATED ORAL AS NEEDED
Qty: 6 TABLET | Refills: 1 | Status: SHIPPED | OUTPATIENT
Start: 2024-06-10

## 2024-07-24 DIAGNOSIS — N52.9 ERECTILE DYSFUNCTION, UNSPECIFIED ERECTILE DYSFUNCTION TYPE: ICD-10-CM

## 2024-07-25 RX ORDER — SILDENAFIL 100 MG/1
100 TABLET, FILM COATED ORAL AS NEEDED
Qty: 6 TABLET | Refills: 1 | Status: SHIPPED | OUTPATIENT
Start: 2024-07-25

## 2024-08-22 ENCOUNTER — APPOINTMENT (OUTPATIENT)
Dept: CARDIOLOGY | Facility: CLINIC | Age: 69
End: 2024-08-22
Payer: MEDICARE

## 2024-09-25 DIAGNOSIS — N52.9 ERECTILE DYSFUNCTION, UNSPECIFIED ERECTILE DYSFUNCTION TYPE: ICD-10-CM

## 2024-09-27 RX ORDER — SILDENAFIL 100 MG/1
100 TABLET, FILM COATED ORAL AS NEEDED
Qty: 6 TABLET | Refills: 1 | Status: SHIPPED | OUTPATIENT
Start: 2024-09-27

## 2024-10-01 DIAGNOSIS — E55.9 VITAMIN D DEFICIENCY: ICD-10-CM

## 2024-10-01 DIAGNOSIS — R79.89 ABNORMAL CBC: ICD-10-CM

## 2024-10-01 DIAGNOSIS — Z00.00 ENCOUNTER FOR ANNUAL WELLNESS EXAM IN MEDICARE PATIENT: ICD-10-CM

## 2024-10-03 ENCOUNTER — APPOINTMENT (OUTPATIENT)
Dept: PRIMARY CARE | Facility: CLINIC | Age: 69
End: 2024-10-03
Payer: MEDICARE

## 2024-10-10 ENCOUNTER — APPOINTMENT (OUTPATIENT)
Dept: PRIMARY CARE | Facility: CLINIC | Age: 69
End: 2024-10-10
Payer: MEDICARE

## 2024-10-10 DIAGNOSIS — N52.9 ERECTILE DYSFUNCTION, UNSPECIFIED ERECTILE DYSFUNCTION TYPE: ICD-10-CM

## 2024-10-10 DIAGNOSIS — Z23 FLU VACCINE NEED: ICD-10-CM

## 2024-10-10 PROCEDURE — 90662 IIV NO PRSV INCREASED AG IM: CPT | Performed by: INTERNAL MEDICINE

## 2024-10-10 PROCEDURE — 1157F ADVNC CARE PLAN IN RCRD: CPT | Performed by: INTERNAL MEDICINE

## 2024-10-10 PROCEDURE — 99211 OFF/OP EST MAY X REQ PHY/QHP: CPT | Performed by: INTERNAL MEDICINE

## 2024-10-10 PROCEDURE — G0008 ADMIN INFLUENZA VIRUS VAC: HCPCS | Performed by: INTERNAL MEDICINE

## 2024-10-10 PROCEDURE — 1123F ACP DISCUSS/DSCN MKR DOCD: CPT | Performed by: INTERNAL MEDICINE

## 2024-10-16 RX ORDER — SILDENAFIL 100 MG/1
100 TABLET, FILM COATED ORAL AS NEEDED
Qty: 12 TABLET | Refills: 6 | Status: SHIPPED | OUTPATIENT
Start: 2024-10-16

## 2024-11-15 ENCOUNTER — LAB (OUTPATIENT)
Dept: LAB | Facility: LAB | Age: 69
End: 2024-11-15
Payer: MEDICARE

## 2024-11-15 DIAGNOSIS — Z00.00 ENCOUNTER FOR ANNUAL WELLNESS EXAM IN MEDICARE PATIENT: ICD-10-CM

## 2024-11-15 DIAGNOSIS — R79.89 ABNORMAL CBC: ICD-10-CM

## 2024-11-15 DIAGNOSIS — E55.9 VITAMIN D DEFICIENCY: ICD-10-CM

## 2024-11-15 LAB
25(OH)D3 SERPL-MCNC: 33 NG/ML (ref 30–100)
ALT SERPL W P-5'-P-CCNC: 20 U/L (ref 10–52)
ANION GAP SERPL CALC-SCNC: 11 MMOL/L (ref 10–20)
APPEARANCE UR: CLEAR
BASOPHILS # BLD AUTO: 0.05 X10*3/UL (ref 0–0.1)
BASOPHILS NFR BLD AUTO: 1 %
BILIRUB UR STRIP.AUTO-MCNC: NEGATIVE MG/DL
BUN SERPL-MCNC: 10 MG/DL (ref 6–23)
CALCIUM SERPL-MCNC: 9.4 MG/DL (ref 8.6–10.6)
CHLORIDE SERPL-SCNC: 97 MMOL/L (ref 98–107)
CHOLEST SERPL-MCNC: 155 MG/DL (ref 0–199)
CHOLESTEROL/HDL RATIO: 2.2
CO2 SERPL-SCNC: 26 MMOL/L (ref 21–32)
COLOR UR: NORMAL
CREAT SERPL-MCNC: 0.68 MG/DL (ref 0.5–1.3)
EGFRCR SERPLBLD CKD-EPI 2021: >90 ML/MIN/1.73M*2
EOSINOPHIL # BLD AUTO: 0.17 X10*3/UL (ref 0–0.4)
EOSINOPHIL NFR BLD AUTO: 3.4 %
ERYTHROCYTE [DISTWIDTH] IN BLOOD BY AUTOMATED COUNT: 13.2 % (ref 11.5–14.5)
EST. AVERAGE GLUCOSE BLD GHB EST-MCNC: 111 MG/DL
GLUCOSE SERPL-MCNC: 93 MG/DL (ref 74–99)
GLUCOSE UR STRIP.AUTO-MCNC: NORMAL MG/DL
HBA1C MFR BLD: 5.5 %
HCT VFR BLD AUTO: 41 % (ref 41–52)
HDLC SERPL-MCNC: 70 MG/DL
HGB BLD-MCNC: 14.1 G/DL (ref 13.5–17.5)
IMM GRANULOCYTES # BLD AUTO: 0.01 X10*3/UL (ref 0–0.5)
IMM GRANULOCYTES NFR BLD AUTO: 0.2 % (ref 0–0.9)
KETONES UR STRIP.AUTO-MCNC: NEGATIVE MG/DL
LDLC SERPL CALC-MCNC: 74 MG/DL
LEUKOCYTE ESTERASE UR QL STRIP.AUTO: NEGATIVE
LYMPHOCYTES # BLD AUTO: 1.7 X10*3/UL (ref 0.8–3)
LYMPHOCYTES NFR BLD AUTO: 33.5 %
MCH RBC QN AUTO: 31.6 PG (ref 26–34)
MCHC RBC AUTO-ENTMCNC: 34.4 G/DL (ref 32–36)
MCV RBC AUTO: 92 FL (ref 80–100)
MONOCYTES # BLD AUTO: 0.58 X10*3/UL (ref 0.05–0.8)
MONOCYTES NFR BLD AUTO: 11.4 %
NEUTROPHILS # BLD AUTO: 2.56 X10*3/UL (ref 1.6–5.5)
NEUTROPHILS NFR BLD AUTO: 50.5 %
NITRITE UR QL STRIP.AUTO: NEGATIVE
NON HDL CHOLESTEROL: 85 MG/DL (ref 0–149)
NRBC BLD-RTO: 0 /100 WBCS (ref 0–0)
PH UR STRIP.AUTO: 6.5 [PH]
PLATELET # BLD AUTO: 215 X10*3/UL (ref 150–450)
POTASSIUM SERPL-SCNC: 4.3 MMOL/L (ref 3.5–5.3)
PROT UR STRIP.AUTO-MCNC: NEGATIVE MG/DL
PSA SERPL-MCNC: 0.22 NG/ML
RBC # BLD AUTO: 4.46 X10*6/UL (ref 4.5–5.9)
RBC # UR STRIP.AUTO: NEGATIVE /UL
SODIUM SERPL-SCNC: 130 MMOL/L (ref 136–145)
SP GR UR STRIP.AUTO: 1
TRIGL SERPL-MCNC: 55 MG/DL (ref 0–149)
TSH SERPL-ACNC: 6.73 MIU/L (ref 0.44–3.98)
UROBILINOGEN UR STRIP.AUTO-MCNC: NORMAL MG/DL
VIT B12 SERPL-MCNC: 564 PG/ML (ref 211–911)
VLDL: 11 MG/DL (ref 0–40)
WBC # BLD AUTO: 5.1 X10*3/UL (ref 4.4–11.3)

## 2024-11-15 PROCEDURE — 84153 ASSAY OF PSA TOTAL: CPT

## 2024-11-15 PROCEDURE — 82607 VITAMIN B-12: CPT

## 2024-11-15 PROCEDURE — 81003 URINALYSIS AUTO W/O SCOPE: CPT

## 2024-11-15 PROCEDURE — 36415 COLL VENOUS BLD VENIPUNCTURE: CPT

## 2024-11-15 PROCEDURE — 83036 HEMOGLOBIN GLYCOSYLATED A1C: CPT

## 2024-11-15 PROCEDURE — 84460 ALANINE AMINO (ALT) (SGPT): CPT

## 2024-11-15 PROCEDURE — 85025 COMPLETE CBC W/AUTO DIFF WBC: CPT

## 2024-11-15 PROCEDURE — 82306 VITAMIN D 25 HYDROXY: CPT

## 2024-11-15 PROCEDURE — 80061 LIPID PANEL: CPT

## 2024-11-15 PROCEDURE — 84443 ASSAY THYROID STIM HORMONE: CPT

## 2024-11-15 PROCEDURE — 80048 BASIC METABOLIC PNL TOTAL CA: CPT

## 2024-12-05 ENCOUNTER — APPOINTMENT (OUTPATIENT)
Dept: PRIMARY CARE | Facility: CLINIC | Age: 69
End: 2024-12-05
Payer: MEDICARE

## 2024-12-05 VITALS
TEMPERATURE: 98 F | OXYGEN SATURATION: 95 % | RESPIRATION RATE: 18 BRPM | DIASTOLIC BLOOD PRESSURE: 70 MMHG | SYSTOLIC BLOOD PRESSURE: 130 MMHG | WEIGHT: 190 LBS | BODY MASS INDEX: 24.38 KG/M2 | HEART RATE: 76 BPM | HEIGHT: 74 IN

## 2024-12-05 DIAGNOSIS — J32.9 SINUSITIS, UNSPECIFIED CHRONICITY, UNSPECIFIED LOCATION: ICD-10-CM

## 2024-12-05 DIAGNOSIS — R19.7 DIARRHEA, UNSPECIFIED TYPE: ICD-10-CM

## 2024-12-05 DIAGNOSIS — E03.9 HYPOTHYROIDISM, UNSPECIFIED TYPE: ICD-10-CM

## 2024-12-05 DIAGNOSIS — L29.9 ITCH: Primary | ICD-10-CM

## 2024-12-05 PROCEDURE — 1157F ADVNC CARE PLAN IN RCRD: CPT | Performed by: INTERNAL MEDICINE

## 2024-12-05 PROCEDURE — 3008F BODY MASS INDEX DOCD: CPT | Performed by: INTERNAL MEDICINE

## 2024-12-05 PROCEDURE — 1158F ADVNC CARE PLAN TLK DOCD: CPT | Performed by: INTERNAL MEDICINE

## 2024-12-05 PROCEDURE — G0439 PPPS, SUBSEQ VISIT: HCPCS | Performed by: INTERNAL MEDICINE

## 2024-12-05 PROCEDURE — 1170F FXNL STATUS ASSESSED: CPT | Performed by: INTERNAL MEDICINE

## 2024-12-05 PROCEDURE — 1123F ACP DISCUSS/DSCN MKR DOCD: CPT | Performed by: INTERNAL MEDICINE

## 2024-12-05 RX ORDER — CIPROFLOXACIN 500 MG/1
500 TABLET ORAL 2 TIMES DAILY
Qty: 20 TABLET | Refills: 0 | Status: SHIPPED | OUTPATIENT
Start: 2024-12-05 | End: 2024-12-15

## 2024-12-05 RX ORDER — HYDROXYZINE HYDROCHLORIDE 10 MG/1
10 TABLET, FILM COATED ORAL NIGHTLY
Qty: 30 TABLET | Refills: 0 | Status: SHIPPED | OUTPATIENT
Start: 2024-12-05

## 2024-12-05 RX ORDER — LEVOTHYROXINE SODIUM 75 UG/1
75 TABLET ORAL DAILY
Qty: 90 TABLET | Refills: 3 | Status: SHIPPED | OUTPATIENT
Start: 2024-12-05

## 2024-12-05 RX ORDER — FLUTICASONE PROPIONATE 50 MCG
1 SPRAY, SUSPENSION (ML) NASAL DAILY
Qty: 16 G | Refills: 1 | Status: SHIPPED | OUTPATIENT
Start: 2024-12-05

## 2024-12-05 ASSESSMENT — PATIENT HEALTH QUESTIONNAIRE - PHQ9
9. THOUGHTS THAT YOU WOULD BE BETTER OFF DEAD, OR OF HURTING YOURSELF: NOT AT ALL
4. FEELING TIRED OR HAVING LITTLE ENERGY: NOT AT ALL
10. IF YOU CHECKED OFF ANY PROBLEMS, HOW DIFFICULT HAVE THESE PROBLEMS MADE IT FOR YOU TO DO YOUR WORK, TAKE CARE OF THINGS AT HOME, OR GET ALONG WITH OTHER PEOPLE: NOT DIFFICULT AT ALL
6. FEELING BAD ABOUT YOURSELF - OR THAT YOU ARE A FAILURE OR HAVE LET YOURSELF OR YOUR FAMILY DOWN: NOT AT ALL
2. FEELING DOWN, DEPRESSED OR HOPELESS: NOT AT ALL
7. TROUBLE CONCENTRATING ON THINGS, SUCH AS READING THE NEWSPAPER OR WATCHING TELEVISION: NOT AT ALL
8. MOVING OR SPEAKING SO SLOWLY THAT OTHER PEOPLE COULD HAVE NOTICED. OR THE OPPOSITE, BEING SO FIGETY OR RESTLESS THAT YOU HAVE BEEN MOVING AROUND A LOT MORE THAN USUAL: NOT AT ALL
3. TROUBLE FALLING OR STAYING ASLEEP OR SLEEPING TOO MUCH: NOT AT ALL
1. LITTLE INTEREST OR PLEASURE IN DOING THINGS: NOT AT ALL
SUM OF ALL RESPONSES TO PHQ9 QUESTIONS 1 AND 2: 0

## 2024-12-05 ASSESSMENT — ACTIVITIES OF DAILY LIVING (ADL)
MANAGING_FINANCES: INDEPENDENT
DOING_HOUSEWORK: INDEPENDENT
BATHING: INDEPENDENT
DRESSING: INDEPENDENT
GROCERY_SHOPPING: INDEPENDENT
TAKING_MEDICATION: INDEPENDENT

## 2024-12-05 ASSESSMENT — ENCOUNTER SYMPTOMS
OCCASIONAL FEELINGS OF UNSTEADINESS: 1
LOSS OF SENSATION IN FEET: 0
DEPRESSION: 0

## 2024-12-05 NOTE — PROGRESS NOTES
Subjective   Patient ID: Chris Briones is a 69 y.o. male who presents for Medicare Annual Wellness Visit Subsequent.    Patient comes in for a physical examination, doing well over - all with no particular complaints.   Also in for laboratory review and health maintenance update.  Updating family history as well.          Review of Systems   All other systems reviewed and are negative.      Previous history  Past Medical History:   Diagnosis Date    Unspecified osteoarthritis, unspecified site 03/10/2022    Arthritis     Past Surgical History:   Procedure Laterality Date    APPENDECTOMY  11/10/2014    Appendectomy    BACK SURGERY  11/10/2014    Back Surgery    ROTATOR CUFF REPAIR  08/22/2016    Rotator Cuff Repair    TONSILLECTOMY  11/10/2014    Tonsillectomy     Social History     Tobacco Use    Smoking status: Never    Smokeless tobacco: Never   Vaping Use    Vaping status: Never Used   Substance Use Topics    Alcohol use: Yes     Alcohol/week: 15.0 standard drinks of alcohol     Types: 15 Cans of beer per week     Comment: 15 beers per week    Drug use: Never     No family history on file.  Allergies   Allergen Reactions    Dog Dander Other    House Dust Other     Current Outpatient Medications   Medication Instructions    acetaminophen (TYLENOL) 500-1,000 mg, oral, Every 8 hours PRN    ammonium lactate (Amlactin) 12 % cream apply to body once daily after a shower or bath    aspirin 650 mg, oral, Daily, As needed    ciprofloxacin (CIPRO) 500 mg, oral, 2 times daily    clobetasol (Temovate) 0.05 % cream APPLY TO THE AFFECTED AREA(S) on the body TWICE DAILY    Deep Sea Nasal 0.65 % nasal spray Use 1 Spray in the nose as needed for cold/allergy symptoms.    docusate sodium (COLACE) 100 mg, oral, 2 times daily PRN    finasteride (PROSCAR) 5 mg, oral, Daily    fluocinonide 0.05 % cream APPLY TO THE AFFECTED AREA(S) of rash on the body once daily    fluticasone (Flonase) 50 mcg/actuation nasal spray 1 spray, nasal,  Every 12 hours PRN    hydrOXYzine HCL (ATARAX) 10 mg, oral, Nightly    levothyroxine (SYNTHROID, LEVOXYL) 75 mcg, oral, Daily    methocarbamol (ROBAXIN) 500 mg, oral, 3 times daily PRN    multivitamin with minerals iron-free (Adults 50 Plus) oral, Daily RT    mupirocin (Bactroban) 2 % ointment Apply 0.5 inch with a Q-tip in each nostril in the morning and evening for 5 days before surgery.    naproxen (Naprosyn) 500 mg tablet oral    nystatin-triamcinolone (Mycolog II) ointment Apply to angles of mouth twice daily as needed    sildenafil (VIAGRA) 100 mg, oral, As needed    tamsulosin (FLOMAX) 0.4 mg, oral, Nightly    tobramycin-dexamethasone (Tobradex) ophthalmic suspension ophthalmic (eye), Every 6 hours    triamcinolone (Kenalog) 0.1 % oral paste 1 Application, Mouth/Throat, Daily    walker misc 1 each, miscellaneous, Daily       Objective       Physical Exam      Assessment/Plan   Chris Briones is a 69 y.o. male who presents for the concerns below:    Problem List Items Addressed This Visit       Itch - Primary    Relevant Medications    hydrOXYzine HCL (Atarax) 10 mg tablet    Hypothyroidism    Relevant Medications    levothyroxine (Synthroid, Levoxyl) 75 mcg tablet    Diarrhea    Relevant Medications    ciprofloxacin (Cipro) 500 mg tablet        ASSESSMENT AND PLAN: Patient on examination is in fair health except for medical conditions discussed above, obtained screening blood tests to screen for high cholesterol, diabetes, thyroid, Ekg if above 50 years old or earlier if with cardiac history. Patient should be taking enough calcium in a balanced diet  Weight control especially if BMI is above ideal range. For Female Patients Only:  Mammogram yearly and PAP test with gynecology unless s/p Total hysterectomy  Bone density test at age 60 and above and every two years after that. Preventive Medicine: colon cancer screening by age 45 if no family history as well PSA @ 50 , balanced diet, and exercise as discussed.  Seat belt use for injury prevention, living will. Substance use and /or tobacco use counseled when applicable. Alcohol use discussed, use designated . Immunizations TD age 50 and every 10 years. Pneumovax and shingles vaccine counseled. Yearly flu vaccine unless contraindicated. More than 50% of office visit time spent counseling the patient, questions were answered. If problems arise, patient is to call or come back in. It is understood that the responsibility of healthcare is shared by the patient by following a healthy lifestyle and following the plan above as discussed. Advised complete physical examination every year. Pending additional results, may need to come for a return office visit for follow-up.       Discussed with:   Return in :    Portions of this note were generated using digital voice recognition software, and may contain grammatical errors       Edward Hazel MD  12/05/24  11:21 AM

## 2025-04-04 DIAGNOSIS — E03.9 HYPOTHYROIDISM, UNSPECIFIED TYPE: ICD-10-CM

## 2025-04-05 RX ORDER — LEVOTHYROXINE SODIUM 75 UG/1
75 TABLET ORAL DAILY
Qty: 90 TABLET | Refills: 3 | Status: SHIPPED | OUTPATIENT
Start: 2025-04-05

## 2025-05-06 ENCOUNTER — APPOINTMENT (OUTPATIENT)
Dept: ORTHOPEDIC SURGERY | Facility: CLINIC | Age: 70
End: 2025-05-06
Payer: MEDICARE

## 2025-05-06 DIAGNOSIS — M65.30 TRIGGER FINGER, ACQUIRED: Primary | ICD-10-CM

## 2025-05-06 RX ORDER — TRIAMCINOLONE ACETONIDE 40 MG/ML
40 INJECTION, SUSPENSION INTRA-ARTICULAR; INTRAMUSCULAR
Status: COMPLETED | OUTPATIENT
Start: 2025-05-06 | End: 2025-05-06

## 2025-05-06 RX ORDER — LIDOCAINE HYDROCHLORIDE 10 MG/ML
1 INJECTION, SOLUTION INFILTRATION; PERINEURAL
Status: COMPLETED | OUTPATIENT
Start: 2025-05-06 | End: 2025-05-06

## 2025-05-06 RX ADMIN — TRIAMCINOLONE ACETONIDE 40 MG: 40 INJECTION, SUSPENSION INTRA-ARTICULAR; INTRAMUSCULAR at 15:59

## 2025-05-06 RX ADMIN — LIDOCAINE HYDROCHLORIDE 1 ML: 10 INJECTION, SOLUTION INFILTRATION; PERINEURAL at 15:59

## 2025-05-06 ASSESSMENT — PAIN - FUNCTIONAL ASSESSMENT: PAIN_FUNCTIONAL_ASSESSMENT: NO/DENIES PAIN

## 2025-05-06 NOTE — PROGRESS NOTES
History of Present Illness:  Chief Complaint   Patient presents with    Left Hand - Trigger Finger     Left thumb trigger       Patient presents today for evaluation of left thumb pain. Endorses catching of the digit with flexion. Denies any traumatic event or injury. The patient notes that it is worse with periods of disuse and in the morning.  Somewhat better with rest.  The patient endorses sharp focal pain when it catches.  The patient denies any numbness and tingling.  Symptoms began worsening over the last few months.    Medical History[1]    Medication Documentation Review Audit       Reviewed by Nini Cruz CMA (Medical Assistant) on 05/06/25 at 1102      Medication Order Taking? Sig Documenting Provider Last Dose Status   acetaminophen (Tylenol) 500 mg tablet [id number] No Take 1-2 tablets (500-1,000 mg) by mouth every 8 hours if needed. Historical Provider, MD Taking Active   ammonium lactate (Amlactin) 12 % cream [id number] No apply to body once daily after a shower or bath Historical Provider, MD Taking Active   aspirin 325 mg tablet [id number] No Take 2 tablets (650 mg) by mouth once daily. As needed Historical Provider, MD Taking Active   clobetasol (Temovate) 0.05 % cream [id number] No APPLY TO THE AFFECTED AREA(S) on the body TWICE DAILY Historical Provider, MD Taking Active   Deep Sea Nasal 0.65 % nasal spray [id number]  Use 1 Spray in the nose as needed for cold/allergy symptoms. Historical Provider, MD  Active   docusate sodium (Colace) 100 mg capsule [id number] No TAKE 1 CAPSULE BY MOUTH TWICE DAILY AS NEEDED Edward Hazel MD Taking Active   finasteride (Proscar) 5 mg tablet [id number] No Take 1 tablet (5 mg) by mouth once daily. Historical Provider, MD Taking Active   fluocinonide 0.05 % cream [id number] No APPLY TO THE AFFECTED AREA(S) of rash on the body once daily Historical Provider, MD Taking Active   fluticasone (Flonase) 50 mcg/actuation nasal spray [id number]  Administer 1 spray into each  nostril once daily. Edward Hazel MD  Active   hydrOXYzine HCL (Atarax) 10 mg tablet [id number]  Take 1 tablet (10 mg) by mouth once daily at bedtime. Edward Hazel MD  Active   levothyroxine (Synthroid, Levoxyl) 75 mcg tablet [id number]  Take 1 tablet (75 mcg) by mouth once daily. Edward Hazel MD  Active   methocarbamol (Robaxin) 500 mg tablet [id number]  Take 1 tablet (500 mg) by mouth 3 times a day as needed for muscle spasms. Historical Provider, MD  Active   multivitamin with minerals iron-free (Adults 50 Plus) [id number]  Take by mouth once daily. Historical Provider, MD  Active   mupirocin (Bactroban) 2 % ointment [id number]  Apply 0.5 inch with a Q-tip in each nostril in the morning and evening for 5 days before surgery. Historical Provider, MD  Active   naproxen (Naprosyn) 500 mg tablet [id number]  Take by mouth. Historical Provider, MD  Active   nystatin-triamcinolone (Mycolog II) ointment [id number]  Apply to angles of mouth twice daily as needed Historical Provider, MD  Active   sildenafil (Viagra) 100 mg tablet [id number]  Take 1 tablet (100 mg) by mouth if needed for erectile dysfunction. Edward Hazel MD  Active   tamsulosin (Flomax) 0.4 mg 24 hr capsule [id number] No Take 1 capsule (0.4 mg) by mouth once daily at bedtime. Historical Provider, MD Not Taking Active   tobramycin-dexamethasone (Tobradex) ophthalmic suspension [id number]  Administer into affected eye(s) every 6 hours. Historical Provider, MD  Active   triamcinolone (Kenalog) 0.1 % oral paste [id number] No Use 1 Application in the mouth or throat once daily. Edward Hazel MD Taking Active   walker misc [id number]  1 each early in the morning.. Edward Hazel MD  Active                    RX Allergies[2]    Social History     Socioeconomic History    Marital status:      Spouse name: Not on file    Number of children: Not on file    Years of education: Not on file    Highest education level: Not on file   Occupational History     Not on file   Tobacco Use    Smoking status: Never    Smokeless tobacco: Never   Vaping Use    Vaping status: Never Used   Substance and Sexual Activity    Alcohol use: Yes     Alcohol/week: 15.0 standard drinks of alcohol     Types: 15 Cans of beer per week     Comment: 15 beers per week    Drug use: Never    Sexual activity: Not on file   Other Topics Concern    Not on file   Social History Narrative    Not on file     Social Drivers of Health     Financial Resource Strain: Not on file   Food Insecurity: Not on file   Transportation Needs: Not on file   Physical Activity: Not on file   Stress: Not on file   Social Connections: Not on file   Intimate Partner Violence: Not on file   Housing Stability: Not on file       Surgical History[3]       Review of Systems   GENERAL: Negative for malaise, significant weight loss, fever  MUSCULOSKELETAL: see HPI  NEURO:  Negative     Physical Examination  Constitutional: Appears well-developed and well-nourished.  Head: Normocephalic and atraumatic.  Eyes: EOMI grossly  Cardiovascular: Intact distal pulses.   Respiratory: Effort normal. No respiratory distress.  Neurologic: Alert and oriented to person, place, and time.  Skin: Skin is warm and dry.  Hematologic / Lymphatic: No lymphedema, lymphangitis.  Psychiatric: normal mood and affect. Behavior is normal.   Musculoskeletal:  left hand  No obvious atrophy, no skin changes  Tenderness, palpable nodule along the thumb flexor tendon sheath  Palpable catching/crepitus with active flexion and extension   No subluxation of the extensor tendon appreciated over the MP/IP joints.  Sensation intact distally.  Capillary refill less than 2 seconds distally.     Assessment:  Patient with left thumb trigger finger     Plan:  Nature of the diagnosis was discussed with the patient.  We also discussed the risks and benefits of treatment options for trigger finger.  These include splinting, corticosteroid injections and, if conservative  options fail, surgical release.  Longer standing trigger finger reduces likelihood of successful resolution with corticosteroid injection.    The patient elected for corticosteroid injection.  He will follow-up if persistent/recurrent symptoms.    Patient ID: Chris Briones is a 70 y.o. male.    Hand / UE Inj/Asp: L thumb A1 for trigger finger on 5/6/2025 3:59 PM  Indications: pain (triggering)  Details: 25 G needle, volar approach  Medications: 1 mL lidocaine 10 mg/mL (1 %); 40 mg triamcinolone acetonide 40 mg/mL  Outcome: tolerated well, no immediate complications  Procedure, treatment alternatives, risks and benefits explained, specific risks discussed. Consent was given by the patient. Immediately prior to procedure a time out was called to verify the correct patient, procedure, equipment, support staff and site/side marked as required.                    [1]   Past Medical History:  Diagnosis Date    Unspecified osteoarthritis, unspecified site 03/10/2022    Arthritis   [2]   Allergies  Allergen Reactions    Dog Dander Other    House Dust Other   [3]   Past Surgical History:  Procedure Laterality Date    APPENDECTOMY  11/10/2014    Appendectomy    BACK SURGERY  11/10/2014    Back Surgery    ROTATOR CUFF REPAIR  08/22/2016    Rotator Cuff Repair    TONSILLECTOMY  11/10/2014    Tonsillectomy